# Patient Record
Sex: FEMALE | Race: BLACK OR AFRICAN AMERICAN | NOT HISPANIC OR LATINO
[De-identification: names, ages, dates, MRNs, and addresses within clinical notes are randomized per-mention and may not be internally consistent; named-entity substitution may affect disease eponyms.]

---

## 2019-01-30 PROBLEM — Z00.00 ENCOUNTER FOR PREVENTIVE HEALTH EXAMINATION: Status: ACTIVE | Noted: 2019-01-30

## 2019-03-19 ENCOUNTER — APPOINTMENT (OUTPATIENT)
Dept: NEPHROLOGY | Facility: CLINIC | Age: 49
End: 2019-03-19
Payer: COMMERCIAL

## 2019-03-19 VITALS — SYSTOLIC BLOOD PRESSURE: 158 MMHG | DIASTOLIC BLOOD PRESSURE: 90 MMHG | RESPIRATION RATE: 16 BRPM | HEART RATE: 70 BPM

## 2019-03-19 PROCEDURE — 99204 OFFICE O/P NEW MOD 45 MIN: CPT

## 2019-03-19 RX ORDER — TERAZOSIN 5 MG/1
5 CAPSULE ORAL
Qty: 90 | Refills: 0 | Status: ACTIVE | COMMUNITY
Start: 2019-03-19

## 2019-03-19 RX ORDER — AMLODIPINE BESYLATE VALSARTAN HYDROCHLOROTHIAZIDE 10; 25; 320 MG/1; MG/1; MG/1
10-320-25 TABLET, FILM COATED ORAL DAILY
Qty: 90 | Refills: 0 | Status: ACTIVE | COMMUNITY
Start: 2019-03-19

## 2019-03-26 LAB
ALBUMIN MFR SERPL ELPH: 50.3 %
ALBUMIN SERPL ELPH-MCNC: 4.2 G/DL
ALBUMIN SERPL-MCNC: 4 G/DL
ALBUMIN/GLOB SERPL: 1 RATIO
ALDOSTERONE SERUM: 8.4 NG/DL
ALP BLD-CCNC: 100 U/L
ALPHA1 GLOB MFR SERPL ELPH: 3.7 %
ALPHA1 GLOB SERPL ELPH-MCNC: 0.3 G/DL
ALPHA2 GLOB MFR SERPL ELPH: 9.7 %
ALPHA2 GLOB SERPL ELPH-MCNC: 0.8 G/DL
ALT SERPL-CCNC: 11 U/L
ANION GAP SERPL CALC-SCNC: 9 MMOL/L
APPEARANCE: CLEAR
AST SERPL-CCNC: 13 U/L
B-GLOBULIN MFR SERPL ELPH: 14.3 %
B-GLOBULIN SERPL ELPH-MCNC: 1.1 G/DL
BACTERIA: NEGATIVE
BASOPHILS # BLD AUTO: 0.03 K/UL
BASOPHILS NFR BLD AUTO: 0.3 %
BILIRUB SERPL-MCNC: 0.4 MG/DL
BILIRUBIN URINE: NEGATIVE
BLOOD URINE: NEGATIVE
BUN SERPL-MCNC: 21 MG/DL
CALCIUM SERPL-MCNC: 9.8 MG/DL
CALCIUM SERPL-MCNC: 9.8 MG/DL
CHLORIDE SERPL-SCNC: 103 MMOL/L
CO2 SERPL-SCNC: 28 MMOL/L
COLOR: COLORLESS
CORTIS SERPL-MCNC: 7.4 UG/DL
CREAT SERPL-MCNC: 1.2 MG/DL
CREAT SPEC-SCNC: 22 MG/DL
DEPRECATED KAPPA LC FREE/LAMBDA SER: 0.3 RATIO
DEPRECATED KAPPA LC FREE/LAMBDA SER: 0.3 RATIO
EOSINOPHIL # BLD AUTO: 0.21 K/UL
EOSINOPHIL NFR BLD AUTO: 2.1 %
GAMMA GLOB FLD ELPH-MCNC: 1.7 G/DL
GAMMA GLOB MFR SERPL ELPH: 22 %
GLUCOSE QUALITATIVE U: NEGATIVE
GLUCOSE SERPL-MCNC: 74 MG/DL
HCT VFR BLD CALC: 40.2 %
HGB BLD-MCNC: 12.9 G/DL
HYALINE CASTS: 1 /LPF
IGA SER QL IEP: 447 MG/DL
IGG SER QL IEP: 1762 MG/DL
IGM SER QL IEP: 144 MG/DL
IMM GRANULOCYTES NFR BLD AUTO: 0.3 %
INTERPRETATION SERPL IEP-IMP: NORMAL
KAPPA LC CSF-MCNC: 11.02 MG/DL
KAPPA LC CSF-MCNC: 11.02 MG/DL
KAPPA LC SERPL-MCNC: 3.31 MG/DL
KAPPA LC SERPL-MCNC: 3.31 MG/DL
KETONES URINE: NEGATIVE
LEUKOCYTE ESTERASE URINE: NEGATIVE
LYMPHOCYTES # BLD AUTO: 2.42 K/UL
LYMPHOCYTES NFR BLD AUTO: 24.4 %
M PROTEIN SPEC IFE-MCNC: NORMAL
MAN DIFF?: NORMAL
MCHC RBC-ENTMCNC: 28 PG
MCHC RBC-ENTMCNC: 32.1 GM/DL
MCV RBC AUTO: 87.2 FL
METANEPHRINE, PL: 17 PG/ML
MICROALBUMIN 24H UR DL<=1MG/L-MCNC: <1.2 MG/DL
MICROALBUMIN/CREAT 24H UR-RTO: NORMAL MG/G
MICROSCOPIC-UA: NORMAL
MONOCYTES # BLD AUTO: 0.74 K/UL
MONOCYTES NFR BLD AUTO: 7.5 %
NEUTROPHILS # BLD AUTO: 6.47 K/UL
NEUTROPHILS NFR BLD AUTO: 65.4 %
NITRITE URINE: NEGATIVE
NORMETANEPHRINE, PL: 327 PG/ML
PARATHYROID HORMONE INTACT: 74 PG/ML
PH URINE: 6
PLATELET # BLD AUTO: 165 K/UL
POTASSIUM SERPL-SCNC: 4 MMOL/L
PROT SERPL-MCNC: 7.9 G/DL
PROTEIN URINE: NEGATIVE
RBC # BLD: 4.61 M/UL
RBC # FLD: 16.1 %
RED BLOOD CELLS URINE: 2 /HPF
RENIN ACTIVITY, PLASMA: 0.57 NG/ML/HR
SODIUM SERPL-SCNC: 140 MMOL/L
SPECIFIC GRAVITY URINE: 1
SQUAMOUS EPITHELIAL CELLS: 1 /HPF
URATE SERPL-MCNC: 7.5 MG/DL
UROBILINOGEN URINE: NORMAL
WBC # FLD AUTO: 9.9 K/UL
WHITE BLOOD CELLS URINE: 0 /HPF

## 2019-03-26 NOTE — PHYSICAL EXAM
[General Appearance - Alert] : alert [General Appearance - In No Acute Distress] : in no acute distress [Sclera] : the sclera and conjunctiva were normal [PERRL With Normal Accommodation] : pupils were equal in size, round, and reactive to light [Extraocular Movements] : extraocular movements were intact [Outer Ear] : the ears and nose were normal in appearance [Oropharynx] : the oropharynx was normal [Neck Appearance] : the appearance of the neck was normal [Neck Cervical Mass (___cm)] : no neck mass was observed [Jugular Venous Distention Increased] : there was no jugular-venous distention [Thyroid Diffuse Enlargement] : the thyroid was not enlarged [Thyroid Nodule] : there were no palpable thyroid nodules [Auscultation Breath Sounds / Voice Sounds] : lungs were clear to auscultation bilaterally [Heart Rate And Rhythm] : heart rate was normal and rhythm regular [Heart Sounds] : normal S1 and S2 [Heart Sounds Gallop] : no gallops [Murmurs] : no murmurs [Heart Sounds Pericardial Friction Rub] : no pericardial rub [Full Pulse] : the pedal pulses are present [Edema] : there was no peripheral edema [Bowel Sounds] : normal bowel sounds [Abdomen Soft] : soft [Abdomen Tenderness] : non-tender [Urinary Bladder Findings] : the bladder was normal on palpation [Cervical Lymph Nodes Enlarged Posterior Bilaterally] : posterior cervical [Cervical Lymph Nodes Enlarged Anterior Bilaterally] : anterior cervical [Supraclavicular Lymph Nodes Enlarged Bilaterally] : supraclavicular [No CVA Tenderness] : no ~M costovertebral angle tenderness [No Spinal Tenderness] : no spinal tenderness [Abnormal Walk] : normal gait [Skin Color & Pigmentation] : normal skin color and pigmentation [Skin Turgor] : normal skin turgor [] : no rash [Cranial Nerves] : cranial nerves 2-12 were intact [No Focal Deficits] : no focal deficits [Oriented To Time, Place, And Person] : oriented to person, place, and time [Impaired Insight] : insight and judgment were intact [Affect] : the affect was normal

## 2019-03-26 NOTE — ASSESSMENT
[FreeTextEntry1] : 47yo black obese female with chronic longstanding HTN since her 30's, referred by Dr. Homer uMniz for monoclonal paraproteinemia and anemia: \par \par # Chronic HTN - uncontrolled\par - spent some time discussing proper home BP checking techniques, she's to check her BP BID for 3-5 days and then 2-3x/week, if bp >130/85 persistently she should call for adjustment in medications, next line agent would be Labetalol or Carvedilol.\par - stressed importance of weight loss, low Na diet, exercise, eating more fruits/vegetables/DASH diet on lowering BP and CV risk factors. Advise ruling out AMNA if hasn't had a sleep study\par - plasma norepinephrine 2x normal - needs 24hr urine catecholamines to evaluate for pheo\par - ARR/cortisol/TSH wnl\par - u/a, bmp wnl other than Cr 1.2 which may simply be due to MAIKEL blockade\par \par # ?Paraproteinemia w anemia\par - reviewed prior labs showing mildly low albumin 3.6 now normalized on repeat testing, 24hr urine protein 171mg\par 24hr FLC 17.2kappa 12.6 lambda QUE + monoclonal gammopathy, bone Xray negative for lytic lesions\par - QUE/SPEP wnl however FLC serum showing elevated lambda with low-normal FLC ratio 0.3 - will check again next visit. \par Anemia has improved, of note, anemia can be caused by ACEi/ARBs and she's on Valsartan.No overt indication as far as the history I'm aware of for MAIKEL blockade (no CHF/proteinuria), can consider d/c'ing it if Anemia reappears.\par \par 3/26 addendum: discussed results as documented with patient, will send 24hr urine rec for catecholamines to her fax #, hasn't yet bought a 24hr BP monitor, stressed importance, she will do so this week.  \par

## 2019-03-26 NOTE — CONSULT LETTER
[Dear  ___] : Dear  [unfilled], [Consult Letter:] : I had the pleasure of evaluating your patient, [unfilled]. [Please see my note below.] : Please see my note below. [Consult Closing:] : Thank you very much for allowing me to participate in the care of this patient.  If you have any questions, please do not hesitate to contact me. [Sincerely,] : Sincerely, [DrDevorah  ___] : Dr. PRAKASH [FreeTextEntry3] : Betsy Casanova MD\par  of Medicine\par Division of Kidney Diseases and Hypertension\par [facility] \par [employer] [facility]\par \par \par

## 2019-03-26 NOTE — HISTORY OF PRESENT ILLNESS
[FreeTextEntry1] : 49yo black obese female with chronic longstanding HTN since her 30's, referred by Dr. Homer Muniz for monoclonal paraproteinemia and anemia: \par \par PCP Dr. Dania Combs\par \par She's had HTN for 20 years, always difficult to control, anemia also long standing, unsure of etiology. perimenopausal.\par  This week she's had bad headache the last 2-3 weeks, taking 2 excedrin daily. This is abnormal for her, usually doesn't have h/a, thinks it may be her BP, Going to get a home BP cuff for monitoring. \par Occ LE edema\par \par OTC: B12, tylenol, Fe tablets, rare excedrin or tylenol.\par \par

## 2019-04-02 ENCOUNTER — TRANSCRIPTION ENCOUNTER (OUTPATIENT)
Age: 49
End: 2019-04-02

## 2019-04-04 ENCOUNTER — LABORATORY RESULT (OUTPATIENT)
Age: 49
End: 2019-04-04

## 2019-04-15 ENCOUNTER — APPOINTMENT (OUTPATIENT)
Dept: GASTROENTEROLOGY | Facility: CLINIC | Age: 49
End: 2019-04-15

## 2019-05-01 ENCOUNTER — RESULT REVIEW (OUTPATIENT)
Age: 49
End: 2019-05-01

## 2019-05-01 ENCOUNTER — OUTPATIENT (OUTPATIENT)
Dept: OUTPATIENT SERVICES | Facility: HOSPITAL | Age: 49
LOS: 1 days | Discharge: HOME | End: 2019-05-01
Payer: COMMERCIAL

## 2019-05-01 ENCOUNTER — APPOINTMENT (OUTPATIENT)
Dept: GASTROENTEROLOGY | Facility: HOSPITAL | Age: 49
End: 2019-05-01

## 2019-05-01 PROCEDURE — 45380 COLONOSCOPY AND BIOPSY: CPT | Mod: PT

## 2019-05-01 PROCEDURE — 88305 TISSUE EXAM BY PATHOLOGIST: CPT

## 2019-05-01 PROCEDURE — 45380 COLONOSCOPY AND BIOPSY: CPT

## 2019-05-02 LAB — SURGICAL PATHOLOGY STUDY: SIGNIFICANT CHANGE UP

## 2019-05-06 ENCOUNTER — RESULT REVIEW (OUTPATIENT)
Age: 49
End: 2019-05-06

## 2019-07-03 ENCOUNTER — APPOINTMENT (OUTPATIENT)
Dept: NEPHROLOGY | Facility: CLINIC | Age: 49
End: 2019-07-03
Payer: COMMERCIAL

## 2019-07-03 VITALS
OXYGEN SATURATION: 100 % | DIASTOLIC BLOOD PRESSURE: 87 MMHG | SYSTOLIC BLOOD PRESSURE: 175 MMHG | BODY MASS INDEX: 57.52 KG/M2 | HEART RATE: 75 BPM | WEIGHT: 293 LBS | HEIGHT: 60 IN

## 2019-07-03 VITALS — DIASTOLIC BLOOD PRESSURE: 85 MMHG | SYSTOLIC BLOOD PRESSURE: 144 MMHG

## 2019-07-03 LAB
ALBUPE: 32 %
ALPHA1UPE: 25.4 %
ALPHA2UPE: 16.6 %
BETAUPE: 13.7 %
CREAT SPEC-SCNC: 215 MG/DL
CREAT/PROT UR: 0.1 RATIO
GAMMAUPE: 12.3 %
IGA 24H UR QL IFE: NORMAL
KAPPA LC 24H UR QL: PRESENT
M SPIKE RU: 3.6 %
PROT PATTERN 24H UR ELPH-IMP: NORMAL
PROT UR-MCNC: 24 MG/DL
PROT UR-MCNC: 26 MG/DL
PROT UR-MCNC: 26 MG/DL

## 2019-07-03 PROCEDURE — 99214 OFFICE O/P EST MOD 30 MIN: CPT

## 2019-07-03 RX ORDER — CARVEDILOL 3.12 MG/1
3.12 TABLET, FILM COATED ORAL
Qty: 180 | Refills: 3 | Status: ACTIVE | COMMUNITY
Start: 2019-07-03 | End: 1900-01-01

## 2019-07-07 LAB
ALBUMIN SERPL ELPH-MCNC: 4.2 G/DL
ALP BLD-CCNC: 99 U/L
ALT SERPL-CCNC: 15 U/L
ANION GAP SERPL CALC-SCNC: 17 MMOL/L
AST SERPL-CCNC: 27 U/L
BILIRUB SERPL-MCNC: 0.4 MG/DL
BUN SERPL-MCNC: 14 MG/DL
CALCIUM SERPL-MCNC: 9.3 MG/DL
CHLORIDE SERPL-SCNC: 106 MMOL/L
CO2 SERPL-SCNC: 18 MMOL/L
CREAT SERPL-MCNC: 0.88 MG/DL
GLUCOSE SERPL-MCNC: 85 MG/DL
POTASSIUM SERPL-SCNC: 4.6 MMOL/L
PROT SERPL-MCNC: 8 G/DL
SODIUM SERPL-SCNC: 141 MMOL/L

## 2019-07-07 NOTE — HISTORY OF PRESENT ILLNESS
[FreeTextEntry1] : 49yo black obese female with chronic longstanding HTN since her 30's, referred by Dr. Homer Muniz for monoclonal paraproteinemia and anemia: \par \par PCP Dr. Dania Combs\par \par Dx with AMNA 3yrs ago but not using mask. \par No headaches in last 3 months, used to get often, forehead or top of her head. Thought the y were sinus related but likely 2/2 HTN. No excedrin use in last 3 months\par No LE edema\par Gained weight, describes liking to eat often, denies emotional base to eating. Started walking at work now as exercise. \par \par \par She's had HTN for 20 years, always difficult to control, anemia also long standing, unsure of etiology. perimenopausal.\par  This week she's had bad headache the last 2-3 weeks, taking 2 excedrin daily. This is abnormal for her, usually doesn't have h/a, thinks it may be her BP, Going to get a home BP cuff for monitoring. \par Occ LE edema\par \par OTC: B12, tylenol, Fe tablets, rare excedrin or tylenol.\par \par

## 2019-07-07 NOTE — ASSESSMENT
[FreeTextEntry1] : 47yo black obese female with chronic longstanding HTN since her 30's, referred by Dr. Homer Muniz for monoclonal paraproteinemia and anemia: \par needs update\par renal sono w duplex likely low yield, low susiicon and will be techincally difficult due to  body habitus. All other secondary HTN w/u negative incl 24hr catecholamines. \par \par Bence jones protienuria minimal \par # Chronic HTN - uncontrolled\par - spent some time discussing proper home BP checking techniques, she's to check her BP BID for 3-5 days and then 2-3x/week, if bp >130/85 persistently she should call for adjustment in medications, next line agent would be Labetalol or Carvedilol.\par - stressed importance of weight loss, low Na diet, exercise, eating more fruits/vegetables/DASH diet on lowering BP and CV risk factors. Advise ruling out AMNA if hasn't had a sleep study\par - plasma norepinephrine 2x normal - needs 24hr urine catecholamines to evaluate for pheo\par - ARR/cortisol/TSH wnl\par - u/a, bmp wnl other than Cr 1.2 which may simply be due to MAIKEL blockade\par \par # ?Paraproteinemia w anemia\par - reviewed prior labs showing mildly low albumin 3.6 now normalized on repeat testing, 24hr urine protein 171mg\par 24hr FLC 17.2kappa 12.6 lambda QUE + monoclonal gammopathy, bone Xray negative for lytic lesions\par - QUE/SPEP wnl however FLC serum showing elevated lambda with low-normal FLC ratio 0.3 - will check again next visit. \par Anemia has improved, of note, anemia can be caused by ACEi/ARBs and she's on Valsartan.No overt indication as far as the history I'm aware of for MAIKEL blockade (no CHF/proteinuria), can consider d/c'ing it if Anemia reappears.\par \par 3/26 addendum: discussed results as documented with patient, will send 24hr urine rec for catecholamines to her fax #, hasn't yet bought a 24hr BP monitor, stressed importance, she will do so this week.  \par \par Addendum 7/7: reviewed cmp - wnl Cr 0.8

## 2019-10-04 ENCOUNTER — APPOINTMENT (OUTPATIENT)
Dept: NEPHROLOGY | Facility: CLINIC | Age: 49
End: 2019-10-04
Payer: COMMERCIAL

## 2019-10-04 VITALS — SYSTOLIC BLOOD PRESSURE: 122 MMHG | DIASTOLIC BLOOD PRESSURE: 72 MMHG | RESPIRATION RATE: 16 BRPM | HEART RATE: 72 BPM

## 2019-10-04 DIAGNOSIS — D64.9 ANEMIA, UNSPECIFIED: ICD-10-CM

## 2019-10-04 PROCEDURE — 99214 OFFICE O/P EST MOD 30 MIN: CPT

## 2019-10-04 RX ORDER — CHLORHEXIDINE GLUCONATE 4 %
325 (65 FE) LIQUID (ML) TOPICAL TWICE DAILY
Qty: 60 | Refills: 0 | Status: DISCONTINUED | COMMUNITY
Start: 2019-03-19 | End: 2019-10-04

## 2019-10-04 NOTE — PHYSICAL EXAM
[General Appearance - Alert] : alert [General Appearance - In No Acute Distress] : in no acute distress [PERRL With Normal Accommodation] : pupils were equal in size, round, and reactive to light [Extraocular Movements] : extraocular movements were intact [Sclera] : the sclera and conjunctiva were normal [Oropharynx] : the oropharynx was normal [Outer Ear] : the ears and nose were normal in appearance [Neck Cervical Mass (___cm)] : no neck mass was observed [Neck Appearance] : the appearance of the neck was normal [Jugular Venous Distention Increased] : there was no jugular-venous distention [Thyroid Diffuse Enlargement] : the thyroid was not enlarged [Thyroid Nodule] : there were no palpable thyroid nodules [Heart Rate And Rhythm] : heart rate was normal and rhythm regular [Auscultation Breath Sounds / Voice Sounds] : lungs were clear to auscultation bilaterally [Heart Sounds] : normal S1 and S2 [Murmurs] : no murmurs [Heart Sounds Gallop] : no gallops [Heart Sounds Pericardial Friction Rub] : no pericardial rub [Full Pulse] : the pedal pulses are present [Edema] : there was no peripheral edema [Abdomen Soft] : soft [Bowel Sounds] : normal bowel sounds [Abdomen Tenderness] : non-tender [Cervical Lymph Nodes Enlarged Posterior Bilaterally] : posterior cervical [Urinary Bladder Findings] : the bladder was normal on palpation [Cervical Lymph Nodes Enlarged Anterior Bilaterally] : anterior cervical [Supraclavicular Lymph Nodes Enlarged Bilaterally] : supraclavicular [No CVA Tenderness] : no ~M costovertebral angle tenderness [No Spinal Tenderness] : no spinal tenderness [Skin Color & Pigmentation] : normal skin color and pigmentation [Abnormal Walk] : normal gait [Skin Turgor] : normal skin turgor [] : no rash [Cranial Nerves] : cranial nerves 2-12 were intact [No Focal Deficits] : no focal deficits [Oriented To Time, Place, And Person] : oriented to person, place, and time [Impaired Insight] : insight and judgment were intact [Affect] : the affect was normal

## 2019-10-07 PROBLEM — D64.9 ANEMIA: Status: ACTIVE | Noted: 2019-03-19

## 2019-10-07 NOTE — ASSESSMENT
[FreeTextEntry1] : 49yo black obese female with chronic longstanding HTN since her 30's, referred by Dr. Homer Muniz for monoclonal paraproteinemia and anemia: \par \par per pt anemia imrpoved since Fe tablets and DR Muniz only wants annual visits now. \par \par needs update\par renal sono w duplex likely low yield, low susiicon and will be techincally difficult due to  body habitus. All other secondary HTN w/u negative incl 24hr catecholamines. \par \par Bence jones protienuria minimal \par # Chronic HTN - uncontrolled\par - spent some time discussing proper home BP checking techniques, she's to check her BP BID for 3-5 days and then 2-3x/week, if bp >130/85 persistently she should call for adjustment in medications, next line agent would be Labetalol or Carvedilol.\par - stressed importance of weight loss, low Na diet, exercise, eating more fruits/vegetables/DASH diet on lowering BP and CV risk factors. Advise ruling out AMNA if hasn't had a sleep study\par - plasma norepinephrine 2x normal - needs 24hr urine catecholamines to evaluate for pheo\par - ARR/cortisol/TSH wnl\par - u/a, bmp wnl other than Cr 1.2 which may simply be due to MAIKEL blockade\par \par # ?Paraproteinemia w anemia\par - reviewed prior labs showing mildly low albumin 3.6 now normalized on repeat testing, 24hr urine protein 171mg\par 24hr FLC 17.2kappa 12.6 lambda QUE + monoclonal gammopathy, bone Xray negative for lytic lesions\par - QUE/SPEP wnl however FLC serum showing elevated lambda with low-normal FLC ratio 0.3 - will check again next visit. \par Anemia has improved, of note, anemia can be caused by ACEi/ARBs and she's on Valsartan.No overt indication as far as the history I'm aware of for MAIKEL blockade (no CHF/proteinuria), can consider d/c'ing it if Anemia reappears.\par \par 3/26 addendum: discussed results as documented with patient, will send 24hr urine rec for catecholamines to her fax #, hasn't yet bought a 24hr BP monitor, stressed importance, she will do so this week.  \par \par Addendum 7/7: reviewed cmp - wnl Cr 0.8

## 2019-10-07 NOTE — HISTORY OF PRESENT ILLNESS
[FreeTextEntry1] : 47yo black obese female with chronic longstanding HTN since her 30's, referred by Dr. Homer Muniz for monoclonal paraproteinemia and anemia: \par \par PCP Dr. Dania Combs\par Onc Dr. Muniz\par \par AMNA but only used twice in summer bc too expensive to keep fan and cpap going at nonight but now that cold weather is coming she'll use more often. \par No SOB. Good energy. No major health issues or med changes since last visit aside from a bruised toe. \par Her headaches are completely gone. Checking BP nightly at home 120-160/70s-80s. Hits 160s several times/week. \par Stopped using salt in her diet. \par Describes liking to eat often, denies emotional base to eating. Not exercising or losing weight. \par Saw Dr Muniz, f/u is in 1 year for re-evaluation of body xray, lab work. \par \par OTC: B12, tylenol, Fe tablets, rare excedrin or tylenol.\par \par

## 2020-04-08 ENCOUNTER — APPOINTMENT (OUTPATIENT)
Dept: NEPHROLOGY | Facility: CLINIC | Age: 50
End: 2020-04-08
Payer: COMMERCIAL

## 2020-04-08 DIAGNOSIS — D47.2 MONOCLONAL GAMMOPATHY: ICD-10-CM

## 2020-04-08 PROCEDURE — G2012 BRIEF CHECK IN BY MD/QHP: CPT

## 2020-04-08 NOTE — ASSESSMENT
[FreeTextEntry1] : 49-year-old woman with hypertension, on 5 medications.  She has not been able to get her blood pressure checked in the last 6 weeks but feels that it is normal based on absence of symptoms.  I asked her to call us if she experiences headaches, dizziness, or any of the symptoms that usually signal that her blood pressure is high.  She has an adequate supply of all meds.  We reviewed her March labs.

## 2020-04-08 NOTE — HISTORY OF PRESENT ILLNESS
[FreeTextEntry1] : 49-year-old woman with a history of hypertension, anemia, sleep apnea, obesity, and monoclonal gammopathy without myeloma.  She has resistant hypertension on 5 agents including valsartan, amlodipine, HCT, carvedilol, and terazosin.  Her BP was high on an urgent care visit in February, but she had been taking a variety of cold medicines and possibly NSAIDs.  She can usually tell when her BP is high and she does not have any of those symptoms.  Her March laboratory tests showed a potassium of 4.1 and a creatinine of 0.96.  She works for [employer] and [employer] but has been out of work, sheltered in place since March 12.

## 2023-12-13 ENCOUNTER — APPOINTMENT (OUTPATIENT)
Dept: ENDOCRINOLOGY | Facility: CLINIC | Age: 53
End: 2023-12-13
Payer: COMMERCIAL

## 2023-12-13 VITALS
HEART RATE: 90 BPM | BODY MASS INDEX: 57.52 KG/M2 | DIASTOLIC BLOOD PRESSURE: 83 MMHG | SYSTOLIC BLOOD PRESSURE: 133 MMHG | HEIGHT: 60 IN | WEIGHT: 293 LBS

## 2023-12-13 DIAGNOSIS — I10 ESSENTIAL (PRIMARY) HYPERTENSION: ICD-10-CM

## 2023-12-13 PROCEDURE — 36415 COLL VENOUS BLD VENIPUNCTURE: CPT

## 2023-12-13 PROCEDURE — 99205 OFFICE O/P NEW HI 60 MIN: CPT | Mod: 25

## 2023-12-13 NOTE — ASSESSMENT
[Long Term Vascular Complications] : long term vascular complications of diabetes [Importance of Diet and Exercise] : importance of diet and exercise to improve glycemic control, achieve weight loss and improve cardiovascular health [Weight Loss] : weight loss [FreeTextEntry1] : 1) Obesity, Morbid: Class I, complicated by severe DJD and HTN. High risk of metabolic syndrome and future complications. Discussed options including meds, bariatric surgery revision  and lifestyle modification. RB and alternatives discussed. Questions answered and she verbalized understanding. Refer to nutrition and start hypocaloric, hypocarb diet in addition to exercise regimen. as no 5-7% weight loss observed on f/u, will proceed w/ zepbound initiation, titration instructions provided.  2) MNG 12/2022 benign biopsies, obtain US for survellance on the NV in 3 months. check TFTs

## 2023-12-21 LAB
ALBUMIN SERPL ELPH-MCNC: 4.4 G/DL
ALP BLD-CCNC: 117 U/L
ALT SERPL-CCNC: 13 U/L
ANION GAP SERPL CALC-SCNC: 17 MMOL/L
AST SERPL-CCNC: 12 U/L
BILIRUB SERPL-MCNC: 0.5 MG/DL
BUN SERPL-MCNC: 29 MG/DL
CALCIUM SERPL-MCNC: 9.9 MG/DL
CHLORIDE SERPL-SCNC: 108 MMOL/L
CHOLEST SERPL-MCNC: 221 MG/DL
CO2 SERPL-SCNC: 23 MMOL/L
CREAT SERPL-MCNC: 1.37 MG/DL
EGFR: 46 ML/MIN/1.73M2
ESTIMATED AVERAGE GLUCOSE: 114 MG/DL
GLUCOSE SERPL-MCNC: 85 MG/DL
HBA1C MFR BLD HPLC: 5.6 %
HDLC SERPL-MCNC: 70 MG/DL
LDLC SERPL CALC-MCNC: 131 MG/DL
NONHDLC SERPL-MCNC: 151 MG/DL
POTASSIUM SERPL-SCNC: 5.6 MMOL/L
PROT SERPL-MCNC: 8.2 G/DL
SODIUM SERPL-SCNC: 149 MMOL/L
T4 FREE SERPL-MCNC: 1.3 NG/DL
TRIGL SERPL-MCNC: 117 MG/DL
TSH SERPL-ACNC: 2.58 UIU/ML

## 2023-12-27 ENCOUNTER — TRANSCRIPTION ENCOUNTER (OUTPATIENT)
Age: 53
End: 2023-12-27

## 2023-12-31 ENCOUNTER — NON-APPOINTMENT (OUTPATIENT)
Age: 53
End: 2023-12-31

## 2024-03-13 ENCOUNTER — APPOINTMENT (OUTPATIENT)
Dept: ENDOCRINOLOGY | Facility: CLINIC | Age: 54
End: 2024-03-13
Payer: COMMERCIAL

## 2024-03-13 VITALS
WEIGHT: 293 LBS | HEART RATE: 75 BPM | DIASTOLIC BLOOD PRESSURE: 84 MMHG | HEIGHT: 60 IN | SYSTOLIC BLOOD PRESSURE: 133 MMHG | BODY MASS INDEX: 57.52 KG/M2

## 2024-03-13 PROCEDURE — 76536 US EXAM OF HEAD AND NECK: CPT

## 2024-03-13 PROCEDURE — 99214 OFFICE O/P EST MOD 30 MIN: CPT | Mod: 25

## 2024-03-21 NOTE — PROCEDURE
[FreeTextEntry1] : POC US of the Neck 3/2024 , images stored on local hard drive.  Indication: Surveillance  Comparison: N/A  Findings:  The thyroid parenchyma is homogenous, mostly isoechoic and exhibits normal mild vascularity throughout the gland.  The right thyroid lobe contains the following nodules:   A right upper pole nodule is again identified, measuring 1.1 x 0.5 x 1 cm in diameter. It is primarily solid and isoechoic. It's borders are regular and sharp and it exhibits grade 1 peripheral vascularity and no apparent calcifications.  The isthmus measures 0.4 cm and exhibits no discernible nodules.  The left thyroid lobe exhibits the following nodules:  A left lower pole cyst is identified, measuring 0.6 cm in diameter. It is purely cystic w/o solid components. It's borders are regular and sharp.  There are no distinct parathyroids identified on this examination.  No concerning lymphadenopathy observed on bilateral examination.

## 2024-03-21 NOTE — HISTORY OF PRESENT ILLNESS
[FreeTextEntry1] : 54 y/o F w/ Hx of paraproteinemia, HTN, obesity  initial evaluation and management of weight issuesgenerally feels well and endorses no acute complaints. reports lifelong issues w/ weight and hx of childhood obesity. max weight is now. reports she eats 2 meals a day, usually skips brk, largest meal is dinner ~7 PM. eats more during the weekend. usually cooks for herself during the week. preferred starches are bread and rice. admits to soda consumption. past VSG in 2016, reports failure 2/2 appetite control issues. denies steroid/AA use in the past. no past weight loss interventions. physically active. does not carb/calorie count.   3/2024 Here for /fu, generally feels well and endorses no acute complaints. No interval events since LV. Today comes for weight/thyroid follow up, appetite controlled w/ GI tolerance of zepbound. She otherwise denies any f/c, CP, SOB, palpitations, tremors, depressed mood, anxiety, palpitations, n/v, stool/urinary abn, skin/weight changes, heat/cold intolerance, HAs, breast/nipple changes, polyuria/polydipsia/nocturia or other complaints.

## 2024-03-21 NOTE — PHYSICAL EXAM
[Alert] : alert [Well Nourished] : well nourished [No Acute Distress] : no acute distress [Well Developed] : well developed [Normal Sclera/Conjunctiva] : normal sclera/conjunctiva [No Proptosis] : no proptosis [EOMI] : extra ocular movement intact [Thyroid Not Enlarged] : the thyroid was not enlarged [Normal Oropharynx] : the oropharynx was normal [No Thyroid Nodules] : no palpable thyroid nodules [No Respiratory Distress] : no respiratory distress [No Accessory Muscle Use] : no accessory muscle use [Clear to Auscultation] : lungs were clear to auscultation bilaterally [Normal S1, S2] : normal S1 and S2 [Normal Rate] : heart rate was normal [Regular Rhythm] : with a regular rhythm [No Edema] : no peripheral edema [Pedal Pulses Normal] : the pedal pulses are present [Normal Bowel Sounds] : normal bowel sounds [Not Tender] : non-tender [Not Distended] : not distended [Normal Anterior Cervical Nodes] : no anterior cervical lymphadenopathy [Soft] : abdomen soft [No Spinal Tenderness] : no spinal tenderness [Spine Straight] : spine straight [No Stigmata of Cushings Syndrome] : no stigmata of Cushings Syndrome [Normal Gait] : normal gait [Normal Strength/Tone] : muscle strength and tone were normal [No Rash] : no rash [Acanthosis Nigricans] : no acanthosis nigricans [Normal Reflexes] : deep tendon reflexes were 2+ and symmetric [No Tremors] : no tremors [Oriented x3] : oriented to person, place, and time

## 2024-05-07 ENCOUNTER — APPOINTMENT (OUTPATIENT)
Dept: ENDOCRINOLOGY | Facility: CLINIC | Age: 54
End: 2024-05-07
Payer: COMMERCIAL

## 2024-05-07 ENCOUNTER — RESULT REVIEW (OUTPATIENT)
Age: 54
End: 2024-05-07

## 2024-05-07 VITALS
BODY MASS INDEX: 65.04 KG/M2 | SYSTOLIC BLOOD PRESSURE: 135 MMHG | DIASTOLIC BLOOD PRESSURE: 92 MMHG | WEIGHT: 293 LBS | HEART RATE: 79 BPM

## 2024-05-07 PROCEDURE — 10005 FNA BX W/US GDN 1ST LES: CPT

## 2024-05-07 PROCEDURE — 99214 OFFICE O/P EST MOD 30 MIN: CPT | Mod: 25

## 2024-05-07 NOTE — PROCEDURE
[Fine Needle Aspiration] : Fine needle aspiration ~T ~C was performed. [Risks] : risks [Benefits] : benefits [Alternatives] : alternatives [Consent Obtained] : Written consent was obtained prior to the procedure and is detailed in the patient's record [Patient] : the patient [Ethyl Chloride] : ethyl chloride [Supine] : The patient was placed in the supine position with the neck extended as tolerated. [Alcohol] : with alcohol [25 gauge 1.5 inch] : A 25 gauge 1.5 inch needle was used [3 Passes] : 3 passes were made through the mass [Ultrasonic Guidance] : ultrasound guidance was employed [Sent to Histology] : The specimens were prepared in the usual manner and sent to histology. [Afirma] : Afirma [Tolerated Well] : the patient tolerated the procedure well [Vital Signs Stable] : the vital signs were stable [Hemostasis] : hemostasis was assured and the patient was discharged in satisfactory condition [No Complications] : There were no complications [Instructions Given] : Handouts/patient instructions were given to patient [PRN] : as needed. [de-identified] : R thyroid lobe nodule

## 2024-05-07 NOTE — ASSESSMENT
[FreeTextEntry1] : Thyroid nodule:  Right mid lobe nodule measuring 5.5 x 3 x 5.3cm was successfully biopsied under sonographic guidance. An extra sample for PRN Afirma testing was obtained if indeterminate results are present. r/b/a to thyroid biopsy, management of thyroid nodules reviewed. Verbalized understanding and agrees with treatment plan, will contact MD and seek emergency medical care if condition changes.

## 2024-06-05 ENCOUNTER — APPOINTMENT (OUTPATIENT)
Dept: ENDOCRINOLOGY | Facility: CLINIC | Age: 54
End: 2024-06-05
Payer: COMMERCIAL

## 2024-06-05 VITALS
HEIGHT: 60 IN | HEART RATE: 62 BPM | WEIGHT: 293 LBS | DIASTOLIC BLOOD PRESSURE: 72 MMHG | BODY MASS INDEX: 57.52 KG/M2 | SYSTOLIC BLOOD PRESSURE: 122 MMHG

## 2024-06-05 DIAGNOSIS — E04.2 NONTOXIC MULTINODULAR GOITER: ICD-10-CM

## 2024-06-05 DIAGNOSIS — E66.01 MORBID (SEVERE) OBESITY DUE TO EXCESS CALORIES: ICD-10-CM

## 2024-06-05 PROCEDURE — 99214 OFFICE O/P EST MOD 30 MIN: CPT

## 2024-06-05 RX ORDER — TIRZEPATIDE 7.5 MG/.5ML
7.5 INJECTION, SOLUTION SUBCUTANEOUS
Qty: 3 | Refills: 1 | Status: ACTIVE | COMMUNITY
Start: 2023-12-13 | End: 1900-01-01

## 2024-06-05 NOTE — HISTORY OF PRESENT ILLNESS
[FreeTextEntry1] : 54 y/o F w/ Hx of paraproteinemia, HTN, obesity  initial evaluation and management of weight issuesgenerally feels well and endorses no acute complaints. reports lifelong issues w/ weight and hx of childhood obesity. max weight is now. reports she eats 2 meals a day, usually skips brk, largest meal is dinner ~7 PM. eats more during the weekend. usually cooks for herself during the week. preferred starches are bread and rice. admits to soda consumption. past VSG in 2016, reports failure 2/2 appetite control issues. denies steroid/AA use in the past. no past weight loss interventions. physically active. does not carb/calorie count.   6/2024 Here for /fu, generally feels well and endorses no acute complaints. No interval events since LV. Today comes for weight/thyroid follow up, appetite controlled w/ GI tolerance of zepbound. She otherwise denies any f/c, CP, SOB, palpitations, tremors, depressed mood, anxiety, palpitations, n/v, stool/urinary abn, skin/weight changes, heat/cold intolerance, HAs, breast/nipple changes, polyuria/polydipsia/nocturia or other complaints.

## 2024-06-05 NOTE — ASSESSMENT
[Long Term Vascular Complications] : long term vascular complications of diabetes [Importance of Diet and Exercise] : importance of diet and exercise to improve glycemic control, achieve weight loss and improve cardiovascular health [Weight Loss] : weight loss [FreeTextEntry1] : 1) Obesity, Morbid: Class I, complicated by severe DJD and HTN. High risk of metabolic syndrome and future complications. Discussed options including meds, bariatric surgery revision  and lifestyle modification. RB and alternatives discussed. Questions answered and she verbalized understanding. Refer to nutrition and start hypocaloric, hypocarb diet in addition to exercise regimen. as no 5-7% weight loss observed on f/u, will proceed w/ zepbound increase to 7.5 mg, titration instructions provided.  2) MNG s/p FNA w/ benign biopsy on 3/2024, repeat US in 3/2025. Verbalized understanding and agrees with treatment plan, will contact MD and seek emergency medical care if condition changes.

## 2024-10-09 ENCOUNTER — APPOINTMENT (OUTPATIENT)
Dept: ENDOCRINOLOGY | Facility: CLINIC | Age: 54
End: 2024-10-09
Payer: COMMERCIAL

## 2024-10-09 VITALS
WEIGHT: 293 LBS | SYSTOLIC BLOOD PRESSURE: 134 MMHG | DIASTOLIC BLOOD PRESSURE: 81 MMHG | HEART RATE: 81 BPM | BODY MASS INDEX: 62.11 KG/M2

## 2024-10-09 DIAGNOSIS — E66.01 MORBID (SEVERE) OBESITY DUE TO EXCESS CALORIES: ICD-10-CM

## 2024-10-09 DIAGNOSIS — E04.2 NONTOXIC MULTINODULAR GOITER: ICD-10-CM

## 2024-10-09 DIAGNOSIS — I10 ESSENTIAL (PRIMARY) HYPERTENSION: ICD-10-CM

## 2024-10-09 PROCEDURE — 99214 OFFICE O/P EST MOD 30 MIN: CPT

## 2024-10-09 PROCEDURE — G2211 COMPLEX E/M VISIT ADD ON: CPT | Mod: NC

## 2025-04-04 ENCOUNTER — NON-APPOINTMENT (OUTPATIENT)
Age: 55
End: 2025-04-04

## 2025-04-08 ENCOUNTER — APPOINTMENT (OUTPATIENT)
Dept: ENDOCRINOLOGY | Facility: CLINIC | Age: 55
End: 2025-04-08
Payer: COMMERCIAL

## 2025-04-08 VITALS
HEIGHT: 60 IN | DIASTOLIC BLOOD PRESSURE: 72 MMHG | HEART RATE: 72 BPM | SYSTOLIC BLOOD PRESSURE: 135 MMHG | BODY MASS INDEX: 57.52 KG/M2 | WEIGHT: 293 LBS

## 2025-04-08 DIAGNOSIS — I10 ESSENTIAL (PRIMARY) HYPERTENSION: ICD-10-CM

## 2025-04-08 DIAGNOSIS — E04.2 NONTOXIC MULTINODULAR GOITER: ICD-10-CM

## 2025-04-08 DIAGNOSIS — E66.01 MORBID (SEVERE) OBESITY DUE TO EXCESS CALORIES: ICD-10-CM

## 2025-04-08 PROCEDURE — 99214 OFFICE O/P EST MOD 30 MIN: CPT

## 2025-04-08 PROCEDURE — G2211 COMPLEX E/M VISIT ADD ON: CPT | Mod: NC

## 2025-04-10 ENCOUNTER — TRANSCRIPTION ENCOUNTER (OUTPATIENT)
Age: 55
End: 2025-04-10

## 2025-04-21 ENCOUNTER — TRANSCRIPTION ENCOUNTER (OUTPATIENT)
Age: 55
End: 2025-04-21

## 2025-06-28 ENCOUNTER — NON-APPOINTMENT (OUTPATIENT)
Age: 55
End: 2025-06-28

## 2025-06-30 ENCOUNTER — APPOINTMENT (OUTPATIENT)
Dept: NEPHROLOGY | Facility: CLINIC | Age: 55
End: 2025-06-30

## 2025-09-02 ENCOUNTER — APPOINTMENT (OUTPATIENT)
Dept: NEPHROLOGY | Facility: CLINIC | Age: 55
End: 2025-09-02
Payer: COMMERCIAL

## 2025-09-02 VITALS
BODY MASS INDEX: 57.52 KG/M2 | WEIGHT: 293 LBS | HEIGHT: 60 IN | DIASTOLIC BLOOD PRESSURE: 78 MMHG | SYSTOLIC BLOOD PRESSURE: 112 MMHG

## 2025-09-02 DIAGNOSIS — N18.30 CHRONIC KIDNEY DISEASE, STAGE 3 UNSPECIFIED: ICD-10-CM

## 2025-09-02 DIAGNOSIS — I10 ESSENTIAL (PRIMARY) HYPERTENSION: ICD-10-CM

## 2025-09-02 DIAGNOSIS — R80.9 PROTEINURIA, UNSPECIFIED: ICD-10-CM

## 2025-09-02 DIAGNOSIS — D47.2 MONOCLONAL GAMMOPATHY: ICD-10-CM

## 2025-09-02 PROCEDURE — G2211 COMPLEX E/M VISIT ADD ON: CPT | Mod: NC

## 2025-09-02 PROCEDURE — 99204 OFFICE O/P NEW MOD 45 MIN: CPT
